# Patient Record
Sex: MALE | URBAN - METROPOLITAN AREA
[De-identification: names, ages, dates, MRNs, and addresses within clinical notes are randomized per-mention and may not be internally consistent; named-entity substitution may affect disease eponyms.]

---

## 2017-04-10 ENCOUNTER — IMPORTED ENCOUNTER (OUTPATIENT)
Dept: URBAN - METROPOLITAN AREA CLINIC 1 | Facility: CLINIC | Age: 62
End: 2017-04-10

## 2017-04-10 PROBLEM — H25.812: Noted: 2017-04-10

## 2017-04-10 PROBLEM — H40.1132: Noted: 2017-04-10

## 2017-04-10 PROBLEM — Z96.1: Noted: 2017-04-10

## 2017-04-10 PROBLEM — H04.123: Noted: 2017-04-10

## 2017-04-10 PROBLEM — H40.1231: Noted: 2017-04-10

## 2017-04-10 PROBLEM — H26.491: Noted: 2017-04-10

## 2017-04-10 PROCEDURE — 92004 COMPRE OPH EXAM NEW PT 1/>: CPT

## 2017-04-10 PROCEDURE — 83861 MICROFLUID ANALY TEARS: CPT

## 2017-04-10 PROCEDURE — 92250 FUNDUS PHOTOGRAPHY W/I&R: CPT

## 2017-04-10 PROCEDURE — 92015 DETERMINE REFRACTIVE STATE: CPT

## 2022-03-16 NOTE — PATIENT DISCUSSION
1.  Mild Low tension glaucoma OU (CD 0.85/0.75) - IOP 8 OU. Cont Dorzolamide-Timolol QAM OU and Latanoprost QHS OU. Patient advised to be compliant with gtts. Condition was discussed with patient and patient understands. Will continue to monitor patient for any progression in condition. Patient was advised to call us with any problems questions or concerns. Baseline disc photos today. 2.  Dry Eyes OU -- Tear lab results 294/298. Recommended to patient to use Artificial Tears BID OU3. Cataract OS: Observe for now without intervention. The patient was advised to contact us if any change or worsening of vision4. Pseudophakia OD 5. PCO  OD (Posterior Capsule Opacification)  Observe and consider yag cap when pt feels pco visually significant and visual acuity decreases to appropriate level. MRX for glasses givenReturn for an appointment in 4 months 10/VF with Dr. Diane Freed. 1. The severity of signs/symptoms.(See ED/admit documents)

## 2022-04-03 ASSESSMENT — VISUAL ACUITY
OD_SC: 20/20
OS_CC: J2
OS_SC: 20/20-1
OD_CC: J1

## 2022-04-03 ASSESSMENT — TONOMETRY
OS_IOP_MMHG: 8
OD_IOP_MMHG: 8